# Patient Record
Sex: MALE | Race: WHITE | NOT HISPANIC OR LATINO | URBAN - NONMETROPOLITAN AREA
[De-identification: names, ages, dates, MRNs, and addresses within clinical notes are randomized per-mention and may not be internally consistent; named-entity substitution may affect disease eponyms.]

---

## 2017-12-05 NOTE — PATIENT DISCUSSION
Patient educated with Basic +, will most likely need glasses for all focal points after surgery. Patient educated he may become more dependent on glasses than he is now because we do not treat astigmatism with the Basic + option. Patient educated he does have some astigmatism in both eyes.

## 2018-02-12 NOTE — PATIENT DISCUSSION
Patient educated with the Basic + option, they will most likely need prescription glasses at all focal points after surgery. Patient elects Basic + OD, goal of emmetropia.

## 2018-08-01 PROBLEM — H40.013: Noted: 2018-08-01

## 2018-08-01 PROBLEM — H43.393: Noted: 2018-08-01

## 2018-08-01 PROBLEM — D31.31: Noted: 2018-08-01

## 2018-08-01 PROBLEM — Z96.1: Noted: 2018-08-01

## 2018-08-21 NOTE — PATIENT DISCUSSION
"2018       RE: Cy Seymour  [address]   [facility] [address]     Dear Colleague,    Thank you for referring your patient, Cy Seymour, to the [facility] [place] at [facility]. Please see a copy of my visit note below.    UROLOGIC DIAGNOSES:     CURRENT INTERVENTIONS:       HISTORY:     Pt is a very pleasant 72 yo white male who presents to office today complaining of enlarged prostate and interested in MRI fusion biopsy. He states he has been followed by another urologist in the past and was diagnosed with an enlarged prostate. He reports his PSA has been monitored over past several years and has ranged 3-8. He also says he has undergone 1-2 MRIs of prostate in past with positive lesions but these results are unavailable at this time. He was considering a \"Urolift\" procedure but was told he should undergo prostate biospy prior to this intervention to rule out prostate cancer. He was scheduled for biopsy at his previous urologist but opted to pursue MRI fusion biopsy with another provider.    His symptoms include nocturia, on average 5 times per night but as severe as 10 times per night. He also c/o of slow stream, starting and stopping, urgency with rare instances of leakage and incontinence. He does not wear pads/diapers. He has been prescribed medication for BPH in the past but has never taken anything.    Denies history of UTIs, kidney infections, or kidney stones.He reports a positive family hx of prostate cancer in father ( of CHF at 88 yo; tx unknown) and brother (living at 76 yo; tx with radiation).      Patient s/p prostate biopsy complicated by UTI with fever. Patient also with orchitis with reactive hydrocele. U/s today showed reactive hydrocele but no abscess.     Patient states today that his urinary symptoms are tolerable. Did not have UDS, did not schedule for TURP. Not taking alfuzosin. " Instructed to call immediately if any new distortion, blurring, decreased vision or eye pain.     Had MRI prostate that did not show any suspicious lesions.   PSA today was 9.3      We discussed PSA result, MRI results, need for future biopsy, alpha blocker use, lower urinary tract symptoms.       PAST MEDICAL HISTORY:   Past Medical History:   Diagnosis Date     ADHD (attention deficit hyperactivity disorder)      Aneurysm artery, iliac (H)     father had AAA surgery     Anxiety      Atrial fibrillation (H) 5/9/2011     Bicuspid aortic valve      Coronary artery disease     ablation 2011     Depressive disorder      Diverticulitis      Gastro-oesophageal reflux disease      Hearing loss      History of spinal cord injury      Hypertension      Polymyalgia rheumatica (H)      Prostate infection      Restless legs      S/P nasal septoplasty     1998     Seasonal affective disorder (H)      Sleep apnea      Tachycardia     exercise initiated       PAST SURGICAL HISTORY:   Past Surgical History:   Procedure Laterality Date     BACK SURGERY      cervical fusion 2005     BLEPHAROPLASTY BILATERAL  2/27/2012    Procedure:BLEPHAROPLASTY BILATERAL; BILATERAL UPPER LID BLEPHAROPLASTY ; Surgeon:JESSE LINDSEY; Location[facility]     CARDIAC SURGERY      stent 2003     CYSTOSCOPY       ORTHOPEDIC SURGERY      bilat hip replaced 2008,2009     PROSTATE SURGERY         FAMILY HISTORY:   Family History   Problem Relation Age of Onset     Arthritis Mother      Hypertension Mother      Hypertension Father      Heart Failure Father      HEART DISEASE Maternal Grandfather      HEART DISEASE Paternal Grandmother        SOCIAL HISTORY:   Social History   Substance Use Topics     Smoking status: Never Smoker     Smokeless tobacco: Never Used     Alcohol use Yes      Comment: 1-2 alcoholic beverages per month       Current Outpatient Prescriptions   Medication     albuterol (PROAIR HFA/PROVENTIL HFA/VENTOLIN HFA) 108 (90 BASE) MCG/ACT Inhaler     alfuzosin (UROXATRAL) 10 MG 24 hr tablet     AMLODIPINE BESYLATE PO     aspirin 81 MG  "chewable tablet     cefpodoxime (VANTIN) 100 MG tablet     diazepam (VALIUM) 10 MG tablet     METHOTREXATE PO     nitroglycerin (NITROSTAT) 0.4 MG SL tablet     predniSONE (DELTASONE) 20 MG tablet     sildenafil (VIAGRA) 100 MG cap/tab     vitamin D (ERGOCALCIFEROL) 04967 UNIT capsule     No current facility-administered medications for this visit.          PHYSICAL EXAM:    Ht 1.88 m (6' 2\")    HEENT: Normocephalic and atraumatic   Cardiac: Not done  Back/Flank: Not done  CNS/PNS: Not done  Respiratory: Normal non-labored breathing  Abdomen: Soft nontender and nondistended  Peripheral Vascular: Not done  Mental Status: Not done    Penis: Not done  Scrotal Skin: Not done  Testicles: Not done  Epididymis: Not done  Digital Rectal Exam:     Cystoscopy: Not done    Imaging: None    Urinalysis: UA RESULTS:  Recent Labs   Lab Test  09/19/12   0959   COLOR  Yellow   APPEARANCE  Clear   URINEGLC  Negative   URINEBILI  Negative   URINEKETONE  Negative   SG  1.016   UBLD  Negative   URINEPH  5.0   PROTEIN  Negative   NITRITE  Negative   LEUKEST  Small*   RBCU  0   WBCU  2       PSA:     Post Void Residual:     Other labs: None today      IMPRESSION:  75 y/o M with lower urinary tract symptoms and grade group 1 prostate cancer     PLAN:  Repeat PSA in three months   Uroflow/PVR in three months   Will consider repeat biopsy pending PSA (patient with infection noted post biopsy)   Re-start alfuzosin   Follow up in three months     Total Time: 15 minutes                                    Total in Consultation: greater than 50%       Again, thank you for allowing me to participate in the care of your patient.      Sincerely,    Zulay Lopez MD      "

## 2019-04-05 ENCOUNTER — IMPORTED ENCOUNTER (OUTPATIENT)
Dept: URBAN - NONMETROPOLITAN AREA CLINIC 1 | Facility: CLINIC | Age: 79
End: 2019-04-05

## 2019-04-05 PROCEDURE — 99212 OFFICE O/P EST SF 10 MIN: CPT

## 2019-04-05 NOTE — PATIENT DISCUSSION
PC IOL w/ open capsules OU - excellent Benign Vitreous Floaters OU-Discussed  signs/symptoms of RD or tear.-Discussed in detail the nature of flashes/floaters and to call if there is a sudden increase in their number. Benign Choroidal Nevus:. Superior to Macula- OD-Discussed findings of exam in detail with the patient.-Discussed the risk of choroidal melanoma and the importance of monitoring for this disease. LT Glaucoma Suspect -Suspicious cupping R>L - OD Mild cup and OS Normal RNFL Normal OU -IOP 13:16 04/05/19-Order OCT ONH -Unknown family history No tx unless progression RTC 6 Mo CEE OCT ONH; 's Notes: Gonio 8/1/18VF 8/1/18OCT [facility] 6/6/18OCT [facility] 12/5/18

## 2019-10-28 ENCOUNTER — IMPORTED ENCOUNTER (OUTPATIENT)
Dept: URBAN - NONMETROPOLITAN AREA CLINIC 1 | Facility: CLINIC | Age: 79
End: 2019-10-28

## 2019-10-28 PROCEDURE — 92133 CPTRZD OPH DX IMG PST SGM ON: CPT

## 2019-10-28 PROCEDURE — 99213 OFFICE O/P EST LOW 20 MIN: CPT

## 2019-10-28 NOTE — PATIENT DISCUSSION
PC IOL w/ open capsules OU - excellent Benign Vitreous Floaters OU-Discussed  signs/symptoms of RD or tear.-Discussed in detail the nature of flashes/floaters and to call if there is a sudden increase in their number. Benign Choroidal Nevus:.  Superior to Macula- OD-Discussed findings of exam in detail with the patient.-Discussed the risk of choroidal melanoma and the importance of monitoring for this disease.-Order 5 Line LT Glaucoma Suspect -Suspicious cupping R>L - OD Mild cup and OS Normal RNFL Normal OU -IOP 19:18 10/28/19-OCT ONH performed and reviewed w/pt -Unknown family history -No tx unless progression RTC 6 Mo IOP check 5 Line OD; Dr's Notes: Gonio 8/1/18VF 8/1/18OCT Parmjit Keita 76 10/28/19OCT [facility] 12/5/18

## 2020-06-15 ENCOUNTER — IMPORTED ENCOUNTER (OUTPATIENT)
Dept: URBAN - NONMETROPOLITAN AREA CLINIC 1 | Facility: CLINIC | Age: 80
End: 2020-06-15

## 2020-06-15 PROCEDURE — 92012 INTRM OPH EXAM EST PATIENT: CPT

## 2020-06-15 NOTE — PATIENT DISCUSSION
PC IOL w/ open capsules OU - excellent Benign Vitreous Floaters OU-Discussed  signs/symptoms of RD or tear.-Discussed in detail the nature of flashes/floaters and to call if there is a sudden increase in their number. Benign Choroidal Nevus:. Superior to Macula- OD-Discussed findings of exam in detail with the patient.-Discussed the risk of choroidal melanoma and the importance of monitoring for this disease. 5-line done today appers stableLT Glaucoma Suspect -Suspicious cupping R>L - OD Mild cup and OS Normal RNFL Normal OU -IOP 12 14 6/15/2020-Unknown family history -No tx unless progression RTC 6 Mo DFE and OCT ONH; Dr's Notes: Gonio 8/1/18VF 8/1/18OCT Parmjit Keita 76 10/28/19OCT [facility] 12/5/18

## 2020-12-14 ENCOUNTER — IMPORTED ENCOUNTER (OUTPATIENT)
Dept: URBAN - NONMETROPOLITAN AREA CLINIC 1 | Facility: CLINIC | Age: 80
End: 2020-12-14

## 2020-12-14 PROCEDURE — 92133 CPTRZD OPH DX IMG PST SGM ON: CPT

## 2020-12-14 PROCEDURE — 92014 COMPRE OPH EXAM EST PT 1/>: CPT

## 2020-12-14 NOTE — PATIENT DISCUSSION
LT Glaucoma Suspect -Suspicious cupping R>L - OD Mild cup and OS Normal RNFL Normal OU -IOP 14 OU 12/14/20-OCT ONH performed and reviewed w/pt -Order OCT ONH -Unknown family history -No tx unless progression PC IOL w/ open capsules OU - excellent Benign Vitreous Floaters OU-Discussed  signs/symptoms of RD or tear.-Discussed in detail the nature of flashes/floaters and to call if there is a sudden increase in their number. Benign Choroidal Nevus:. Superior to Macula- OD-Discussed findings of exam in detail with the patient.-Discussed the risk of choroidal melanoma and the importance of monitoring for this disease. RTC 6 Mo IOP check OCT ONH; 's Notes: Thomasio 8/1/18VF 8/1/18OCT Parmjit Keita 76 12/14/20OCT [facility] 12/5/18

## 2021-06-16 ENCOUNTER — IMPORTED ENCOUNTER (OUTPATIENT)
Dept: URBAN - NONMETROPOLITAN AREA CLINIC 1 | Facility: CLINIC | Age: 81
End: 2021-06-16

## 2021-06-16 PROCEDURE — 99213 OFFICE O/P EST LOW 20 MIN: CPT

## 2021-06-16 PROCEDURE — 92133 CPTRZD OPH DX IMG PST SGM ON: CPT

## 2021-06-16 NOTE — PATIENT DISCUSSION
Low Tension Glaucoma Suspect - discussed diagnosis in detail with patient - unknown family history- cup to disc: 0.4 OD and 0.2 OS - IOP 14 OU today - OCT done 6/16/2021: OD: 85um 6/10ss borderline superior RNFL thinning OS: 84um 7/10ss no RNFL thinning - continue to monitor PC IOL w/ open capsules OU - excellent Benign Vitreous Floaters OU-  Discussed  signs/symptoms of RD or tear.-Discussed in detail the nature of flashes/floaters and to call if there is a sudden increase in their number. Benign Choroidal Nevus:. Superior to Macula- OD- Discussed findings of exam in detail with the patient. - Discussed the risk of choroidal melanoma and the importance of monitoring for this disease.; 's Notes: Bhavani 8/1/18VF 8/1/18OCT Parmjit Keita 76 6/16/2021OCT [facility] 12/5/18

## 2022-04-09 ASSESSMENT — PACHYMETRY
OD_CT_UM: 592; ADJ: THICK
OS_CT_UM: 579; ADJ: THICK
OS_CT_UM: 579; ADJ: THICK
OD_CT_UM: 592; ADJ: THICK
OD_CT_UM: 592; ADJ: THICK
OS_CT_UM: 579; ADJ: THICK
OD_CT_UM: 592; ADJ: THICK
OD_CT_UM: 592; ADJ: THICK

## 2022-04-09 ASSESSMENT — TONOMETRY
OS_IOP_MMHG: 14
OD_IOP_MMHG: 19
OS_IOP_MMHG: 14
OS_IOP_MMHG: 16
OS_IOP_MMHG: 14
OD_IOP_MMHG: 12
OD_IOP_MMHG: 13
OD_IOP_MMHG: 14
OD_IOP_MMHG: 14
OS_IOP_MMHG: 18

## 2022-04-09 ASSESSMENT — VISUAL ACUITY
OS_CC: 20/20 BLURRY
OU_CC: 20/20
OS_CC: 20/20
OD_CC: 20/30
OS_CC: 20/30+3
OD_CC: 20/30+
OS_CC: 20/20
OS_CC: 20/20
OD_CC: 20/20

## 2023-07-31 NOTE — PATIENT DISCUSSION
Apologies yes, Ozempic 2mg subcutaneous weekly. Patient understands condition, prognosis and need for follow up care.
